# Patient Record
Sex: FEMALE | Race: WHITE | Employment: UNEMPLOYED | URBAN - METROPOLITAN AREA
[De-identification: names, ages, dates, MRNs, and addresses within clinical notes are randomized per-mention and may not be internally consistent; named-entity substitution may affect disease eponyms.]

---

## 2017-01-27 PROBLEM — I10 ESSENTIAL HYPERTENSION, BENIGN: Status: ACTIVE | Noted: 2017-01-27

## 2018-01-30 PROBLEM — E78.5 HYPERLIPIDEMIA: Status: ACTIVE | Noted: 2018-01-30

## 2018-01-30 PROBLEM — E66.9 OBESITY: Status: ACTIVE | Noted: 2018-01-30

## 2018-03-16 ENCOUNTER — HOSPITAL ENCOUNTER (OUTPATIENT)
Dept: GENERAL RADIOLOGY | Facility: HOSPITAL | Age: 63
Discharge: HOME | End: 2018-03-16
Attending: INTERNAL MEDICINE
Payer: COMMERCIAL

## 2018-03-16 VITALS
RESPIRATION RATE: 16 BRPM | DIASTOLIC BLOOD PRESSURE: 82 MMHG | HEART RATE: 94 BPM | SYSTOLIC BLOOD PRESSURE: 154 MMHG | OXYGEN SATURATION: 98 %

## 2018-03-16 DIAGNOSIS — K57.30 DIVERTICULOSIS OF LARGE INTESTINE WITHOUT DIVERTICULITIS: ICD-10-CM

## 2018-03-16 DIAGNOSIS — Z12.11 COLON CANCER SCREENING: ICD-10-CM

## 2018-03-16 PROCEDURE — 74280 X-RAY XM COLON 2CNTRST STD: CPT | Performed by: INTERNAL MEDICINE

## 2018-03-16 NOTE — IMAGING NOTE
Pt here following incomplete colonoscopy d/t stricture. She came here for barium study with air injection. Following procedure, I was called to check on patient for ongoing abdominal pain with cramping from air installation.  Pt came from rest room stating

## 2018-08-07 PROBLEM — L30.4 INTERTRIGO: Status: ACTIVE | Noted: 2018-08-07

## 2019-01-30 PROBLEM — K57.30 DIVERTICULA OF COLON: Status: ACTIVE | Noted: 2019-01-30

## 2019-01-30 PROBLEM — K56.699 SIGMOID STRICTURE (HCC): Status: ACTIVE | Noted: 2019-01-30

## 2019-02-05 PROBLEM — Z80.0 FAMILY HISTORY OF COLON CANCER: Status: ACTIVE | Noted: 2019-02-05

## 2020-03-10 PROBLEM — M81.0 OSTEOPOROSIS: Status: ACTIVE | Noted: 2020-03-10
